# Patient Record
(demographics unavailable — no encounter records)

---

## 2025-01-31 NOTE — PHYSICAL EXAM
[de-identified] : Physical exam demonstrates the patient to be alert and oriented x 3 and capable of ambulation. The patient is well-developed and well-nourished in no apparent respiratory distress. The majority of the skin is intact bilaterally in the upper extremities without any bilateral elbow lymphadenopathy.  Evaluation of both elbows reveals full symmetric range of motion from full extension to 140 of flexion with full pronation and full supination.   The wrists have symmetric range of motion bilaterally. There are well healed left wrist dorsal portal sites and well healed incision over the dorsal, ulnar wrist. There is tenderness over the left TFCC and ECU and to a lesser degree the dorsal scapholunate ligament. There is no tenderness over the lunotriquetral ligaments bilaterally. There is a negative Burrell's test bilaterally.  There is no tenderness over the pisotriquetral joint, hamate hook, or CMC joints bilaterally. The patient is nontender over both scaphoids and anatomic snuffbox is bilaterally. There is no clubbing cyanosis or edema.  Full, symmetric digital range of motion. There is good capillary refill of the digits bilaterally.  Sensation is intact to light touch bilaterally. [de-identified] : PA, lateral and oblique X-Rays of the left wrist were obtained to assess for bony injury. No evidence of acute fracture or dislocation. No carpal malalignment of the left wrist. No evidence of ulnar impaction syndrome.

## 2025-01-31 NOTE — ASSESSMENT
[FreeTextEntry1] :  I had a lengthy discussion with the patient today regarding her persistent left ulnar-sided wrist pain as well as her dorsal wrist pain.  I explained to her that her dorsal wrist pain is most likely secondary to the occult ganglion cyst overlying the intact dorsal SL ligament.  regarding her persistent left ulnar-sided wrist pain, I explained this may be secondary to a TFCC read tear, although this was not appreciated on the MRI, versus the tenosynovitis and tear of the ECU, which is part of the TFCC complex.   I agreed with Dr. Nicole in performing a second wrist arthroscopy for both diagnostic and therapeutic purposes.  I explained that if a TFCC tear is apparent, a debridement or repair can be performed to help stabilize things.  I explained that the ECU can be addressed simultaneously.  Regarding her occult dorsal ganglion cyst, I explained that immobilizing the left wrist can help diminish some the inflammation associated with that.  The patient will  plan to follow back up with Dr. Nicole for definitive treatment.  She was in accordance with the plan

## 2025-01-31 NOTE — HISTORY OF PRESENT ILLNESS
[Right] : right hand dominant [FreeTextEntry1] : 24 year old female presents for evaluation of left wrist pain which started after an injury in 2022. The patient was diagnosed with a TFCC tear and had a total of 5 wrist injections. The patient underwent a left wrist arthroscopy and TFCC debridement in 2023 (Dr Mk Nicole) with some but overall incomplete relief of symptoms. Complains mainly of persistent ulnar sided wrist pain and to a lesser degree dorsal wrist pain. She has been in physical therapy since then 2-3 times a week and feels as if she has regained full range of motion.  The patient main concern is that she still experiences difficulty with weight bearing activities of the left wrist. She works in production and coaches rock climbing and finds she does not have much strength in that wrist for the past three years.     The patient had an MRI of the left wrist at [facility] on 1/5/2025 which did demonstrates: 1. 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 2. 2 mm residual ganglion cyst overlying the dorsal scapholunate ligament. 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 3. Intact TFC complex.